# Patient Record
Sex: MALE | Race: BLACK OR AFRICAN AMERICAN | Employment: OTHER | URBAN - METROPOLITAN AREA
[De-identification: names, ages, dates, MRNs, and addresses within clinical notes are randomized per-mention and may not be internally consistent; named-entity substitution may affect disease eponyms.]

---

## 2022-08-29 ENCOUNTER — HOSPITAL ENCOUNTER (OUTPATIENT)
Dept: PHYSICAL THERAPY | Age: 62
Discharge: HOME | End: 2022-08-29
Payer: OTHER GOVERNMENT

## 2022-08-29 PROCEDURE — 97162 PT EVAL MOD COMPLEX 30 MIN: CPT

## 2022-08-29 PROCEDURE — 97110 THERAPEUTIC EXERCISES: CPT

## 2022-08-29 NOTE — PROGRESS NOTES
PT DAILY TREATMENT NOTE 10-18    Patient Name: Kelsie Syed  Date:2022  : 1960  [x]  Patient  Verified  Payor: HUANG / Plan: Jama Miranda 74 / Product Type:  /    In time:1110  Out time:1150  Total Treatment Time (min): 40  Visit #: 1 of 8    Treatment Area: Lower back pain [M54.50]  Other low back pain [M54.59]    SUBJECTIVE  Pain Level (0-10 scale): 9  Any medication changes, allergies to medications, adverse drug reactions, diagnosis change, or new procedure performed?: [x] No    [] Yes (see summary sheet for update)  Subjective functional status/changes:   [] No changes reported  See eval    OBJECTIVE    30 min [x]Eval                  []Re-Eval       10 min Therapeutic Exercise:  [] See flow sheet :   Rationale: increase ROM and increase strength to improve the patients ability to perform daily activities      With   [] TE   [] TA   [] neuro   [] other: Patient Education: [x] Review HEP    [] Progressed/Changed HEP based on:   [] positioning   [] body mechanics   [] transfers   [] heat/ice application    [] other:      Other Objective/Functional Measures:      Pain Level (0-10 scale) post treatment: 9    ASSESSMENT/Changes in Function: see POC    Patient will continue to benefit from skilled PT services to modify and progress therapeutic interventions, address functional mobility deficits, address ROM deficits, address strength deficits, analyze and address soft tissue restrictions, and analyze and cue movement patterns to attain remaining goals. [x]  See Plan of Care  []  See progress note/recertification  []  See Discharge Summary         Progress towards goals / Updated goals:     Short Term Goals: To be accomplished in 2 weeks:  1. I and compliant with HEP for self management of symptoms. IE: issued HEP  Long Term Goals: To be accomplished in 4 weeks:  1. Improve FOTO to 53 to indicate improved function with daily activities. IE:40  2.  Increase B hip strength to grossly 4+/5 to improve stability for yard work. IE:grossly 3+/5  3. Increase L/S flexion to finger tips to ankles to increase ease with ADLs. iE: 6\" away from ankles  4. Patient will report >=50% improvement to increase ease with daily activities.    IE; 0%  PLAN  []  Upgrade activities as tolerated     [x]  Continue plan of care  []  Update interventions per flow sheet       []  Discharge due to:_  []  Other:_      BAILEY Funez, CMTPT 8/29/2022  1:50 PM    Future Appointments   Date Time Provider Oli Mcdaniel   9/2/2022  9:00 AM Trellis Postin, PT [facility]   9/13/2022 10:30 AM Trellis Postin, PT [facility]   9/15/2022  9:15 AM Lyle Caruso, PT [facility]   9/19/2022 10:30 AM Trellis Postin, PT [facility]   9/22/2022  9:45 AM Trellis Postin, PT [facility]   9/27/2022 10:30 AM Lyle Caruso, PT [facility]   9/29/2022  9:15 AM Lyle Caruso, PT [facility]   10/5/2022  8:30 AM Clarice Fuentes MD [address] [facility]

## 2022-08-29 NOTE — PROGRESS NOTES
[facility]  [address]  [place], [address]  [phone] fax    Plan of Care/ Statement of Necessity for Physical Therapy Services    Patient name: Allan Dey Start of Care: 2022   Referral source: Maryanne Vu : 1960    Medical Diagnosis: Lower back pain [M54.50]  Other low back pain [M54.59]  Payor: HUANG / Plan: Jama Miranda 74 / Product Type:  /  Onset Date:4/15/22    Treatment Diagnosis: Back pain   Prior Hospitalization: see medical history Provider#: [id number]   Medications: Verified on Patient summary List    Comorbidities: HTN; depression   Prior Level of Function: No limitations; no prior c/o back pain     The Plan of Care and following information is based on the information from the initial evaluation. Assessment/ key information: 57y.o. year old male presents with CC of right sided T-L spine back pain that began after patient underwent an urthera reconstruction. Patient reports he was hospitalized for over a month; was on dialyses for kidney failure. Patient as finally discharged from hospital in stable condition, but continues to have significant right sided T-L spine. Impairments noted today: hypertonicity noted in right T-L paraspinals, QL, right glute med/min; decreased L/S flexion; decreased B hip flexibility; decreased B hip strength. Patient will benefit from physical therapy to address deficits, and ultimately to return patient to prior level of function. Evaluation Complexity History MEDIUM  Complexity : 1-2 comorbidities / personal factors will impact the outcome/ POC ; Examination MEDIUM Complexity : 3 Standardized tests and measures addressing body structure, function, activity limitation and / or participation in recreation  ;Presentation MEDIUM Complexity : Evolving with changing characteristics  ; Clinical Decision Making MEDIUM Complexity : FOTO score of 26-74  Overall Complexity Rating: MEDIUM  Problem List: pain affecting function, decrease ROM, decrease strength, decrease ADL/ functional abilitiies, decrease activity tolerance, and decrease flexibility/ joint mobility   Treatment Plan may include any combination of the following: Therapeutic exercise, Neuromuscular re-education, Physical agent/modality, Manual therapy, and Patient education  Patient / Family readiness to learn indicated by: asking questions, trying to perform skills, and interest  Persons(s) to be included in education: patient (P)  Barriers to Learning/Limitations: None  Patient Goal (s): to decrease pain  Patient Self Reported Health Status: poor  Rehabilitation Potential: good    Short Term Goals: To be accomplished in 2 weeks:  1. I and compliant with HEP for self management of symptoms. Long Term Goals: To be accomplished in 4 weeks:  1. Improve FOTO to 53 to indicate improved function with daily activities. 2. Increase B hip strength to grossly 4+/5 to improve stability for yard work  3. Increase L/S flexion to finger tips to ankles to increase ease with ADLs. 4. Patient will report >=50% improvement to increase ease with daily activities. Frequency / Duration: Patient to be seen 2 times per week for 4 weeks. Patient/ Caregiver education and instruction: Diagnosis, prognosis, exercises   [x]  Plan of care has been reviewed with NIMESH Collins, PT 8/29/2022 12:09 PM    ________________________________________________________________________    I certify that the above Therapy Services are being furnished while the patient is under my care. I agree with the treatment plan and certify that this therapy is necessary.     Physician's Signature:____________Date:_________TIME:________     Chintan Garcia*  ** Signature, Date and Time must be completed for valid certification **    Please sign and return to [facility]. [address] [place] [address] [address]  [phone] fax

## 2022-09-02 ENCOUNTER — HOSPITAL ENCOUNTER (OUTPATIENT)
Dept: PHYSICAL THERAPY | Age: 62
Discharge: HOME | End: 2022-09-02
Payer: OTHER GOVERNMENT

## 2022-09-02 PROCEDURE — 97110 THERAPEUTIC EXERCISES: CPT

## 2022-09-02 PROCEDURE — 97140 MANUAL THERAPY 1/> REGIONS: CPT

## 2022-09-02 PROCEDURE — 97112 NEUROMUSCULAR REEDUCATION: CPT

## 2022-09-02 NOTE — PROGRESS NOTES
PT DAILY TREATMENT NOTE 10-18    Patient Name: Omari Akins  Date:2022  : 1960  [x]  Patient  Verified  Payor:  / Plan: Jama Miranda 74 / Product Type:  /    In time:900  Out time:953  Total Treatment Time (min): 53  Visit #: 2 of 8        Treatment Area: Other low back pain [M54.59]    SUBJECTIVE  Pain Level (0-10 scale): 9 with meds  Any medication changes, allergies to medications, adverse drug reactions, diagnosis change, or new procedure performed?: [x] No    [] Yes (see summary sheet for update)  Subjective functional status/changes:   [] No changes reported  I haven't done any of my exercises. I fell yesterday on my right side so I've been in a lot of pain. OBJECTIVE    Modality rationale: decrease pain to improve the patients ability to tolerate post exercise soreness   Min Type Additional Details   10 []  Ice     [x]  heat  []  Ice massage  []  Laser   []  Anodyne Position:seated  Location:T-l spin   [] Skin assessment post-treatment:  []intact []redness- no adverse reaction    []redness - adverse reaction:         25 min Therapeutic Exercise:  [x] See flow sheet :   Rationale: increase ROM and increase strength to improve the patients ability to perform daily activities      10 min Neuromuscular Re-education:  [x]  See flow sheet :   Rationale: increase ROM and increase strength  to improve the patients ability to recruit TA to improve stability for daily activities     8 min Manual Therapy:  STM right T-L paraspinals; grade 1 right UPAs T/S   The manual therapy interventions were performed at a separate and distinct time from the therapeutic activities interventions.   Rationale: decrease pain, increase ROM, increase tissue extensibility, and decrease trigger points to relax mm and improve mobility for daily activities   With   [] TE   [] TA   [] neuro   [] other: Patient Education: [x] Review HEP    [] Progressed/Changed HEP based on:   [] positioning   [] body mechanics   [] transfers   [] heat/ice application    [] other:      Other Objective/Functional Measures:      Pain Level (0-10 scale) post treatment: 6-7    ASSESSMENT/Changes in Function: 1st follow up today. Patient very apprehensive in the beginning of the treatment, but gained confidence with exercises. Positive response to therapy, as patient left with significant lower pain levels. Patient will continue to benefit from skilled PT services to modify and progress therapeutic interventions, address functional mobility deficits, address ROM deficits, address strength deficits, analyze and address soft tissue restrictions, and analyze and cue movement patterns to attain remaining goals. []  See Plan of Care  []  See progress note/recertification  []  See Discharge Summary         Progress towards goals / Updated goals:  Short Term Goals: To be accomplished in 2 weeks:  1. I and compliant with HEP for self management of symptoms. IE: issued HEP  Long Term Goals: To be accomplished in 4 weeks:  1. Improve FOTO to 53 to indicate improved function with daily activities. IE:40  2. Increase B hip strength to grossly 4+/5 to improve stability for yard work. IE:grossly 3+/5  3. Increase L/S flexion to finger tips to ankles to increase ease with ADLs. iE: 6\" away from ankles  4. Patient will report >=50% improvement to increase ease with daily activities.    IE; 0%    PLAN  []  Upgrade activities as tolerated     [x]  Continue plan of care  []  Update interventions per flow sheet       []  Discharge due to:_  []  Other:_      BAILEY Mcmanus, CMTPT 9/2/2022  8:36 AM    Future Appointments   Date Time Provider Oli Mcdaniel   9/2/2022  9:00 AM Tian Oviedo, PT [facility]   9/13/2022 10:30 AM Tian Oviedo, PT [facility]   9/15/2022  9:15 AM Richardson Davies, PT [facility]   9/19/2022 10:30 AM Tian Oviedo PT [facility]   9/22/2022  9:45 AM Natanael SINCLAIR, PT [facility]   9/27/2022 10:30 AM Linn Pelaez, PT [facility]   9/29/2022  9:15 AM Linn Pelaez, PT [facility]   10/5/2022  8:30 AM Ying Altman MD [address] [facility]

## 2022-09-13 ENCOUNTER — HOSPITAL ENCOUNTER (OUTPATIENT)
Dept: PHYSICAL THERAPY | Age: 62
Discharge: HOME | End: 2022-09-13
Payer: OTHER GOVERNMENT

## 2022-09-13 PROCEDURE — 97110 THERAPEUTIC EXERCISES: CPT

## 2022-09-13 PROCEDURE — 97112 NEUROMUSCULAR REEDUCATION: CPT

## 2022-09-13 PROCEDURE — 97140 MANUAL THERAPY 1/> REGIONS: CPT

## 2022-09-13 NOTE — PROGRESS NOTES
PT DAILY TREATMENT NOTE 10-18    Patient Name: Karl Alicea  Date:2022  : 1960  [x]  Patient  Verified  Payor: HUANG / Plan: Jama Miranda 74 / Product Type:  /    In time:1030  Out time:1125  Total Treatment Time (min): 55  Visit #: 3 of 8        Treatment Area: Other low back pain [M54.59]    SUBJECTIVE  Pain Level (0-10 scale): 7-8 with meds  Any medication changes, allergies to medications, adverse drug reactions, diagnosis change, or new procedure performed?: [x] No    [] Yes (see summary sheet for update)  Subjective functional status/changes:   [] No changes reported  I felt pretty good after last time, but the pain came back. OBJECTIVE    Modality rationale: decrease pain to improve the patients ability to tolerate post exercise soreness   Min Type Additional Details   10 []  Ice     [x]  heat  []  Ice massage  []  Laser   []  Anodyne Position:seated  Location:T-L spine   [] Skin assessment post-treatment:  []intact []redness- no adverse reaction    []redness - adverse reaction:         27 min Therapeutic Exercise:  [x] See flow sheet :   Rationale: increase ROM and increase strength to improve the patients ability to perform daily activities      10 min Neuromuscular Re-education:  [x]  See flow sheet :   Rationale: increase strength  to improve the patients ability to recruit TA and glutes for daily activities     8 min Manual Therapy:  STM right T-L paraspinals; grade 1 right UPAs T/S   The manual therapy interventions were performed at a separate and distinct time from the therapeutic activities interventions.   Rationale: decrease pain, increase ROM, and increase tissue extensibility to perform daily activities   With   [] TE   [] TA   [] neuro   [] other: Patient Education: [x] Review HEP    [] Progressed/Changed HEP based on:   [] positioning   [] body mechanics   [] transfers   [] heat/ice application    [] other:      Other Objective/Functional Measures: Pain Level (0-10 scale) post treatment: 5-6    ASSESSMENT/Changes in Function: Added 1/2 prone hip work today; tolerated well. Positive repsonse to therapy as patient left with significant decrease in pain. Patient will continue to benefit from skilled PT services to  to attain remaining goals. []  See Plan of Care  []  See progress note/recertification  []  See Discharge Summary         Progress towards goals / Updated goals:  Short Term Goals: To be accomplished in 2 weeks:  1. I and compliant with HEP for self management of symptoms. IE: issued HEP  Current: goal met 9/13/22  Long Term Goals: To be accomplished in 4 weeks:  1. Improve FOTO to 53 to indicate improved function with daily activities. IE:40  2. Increase B hip strength to grossly 4+/5 to improve stability for yard work. IE:grossly 3+/5  3. Increase L/S flexion to finger tips to ankles to increase ease with ADLs. iE: 6\" away from ankles  4. Patient will report >=50% improvement to increase ease with daily activities.    IE; 0%    PLAN  [x]  Upgrade activities as tolerated     []  Continue plan of care  []  Update interventions per flow sheet       []  Discharge due to:_  []  Other:_      BAILEY To, CMTPT 9/13/2022  8:45 AM    Future Appointments   Date Time Provider Oli Mcdaniel   9/13/2022 10:30 AM Diaz Snell, PT [facility]   9/15/2022  9:15 AM Euna Shed, PT [facility]   9/19/2022 10:30 AM Diaz Snell, PT [facility]   9/22/2022  9:45 AM Diaz Snell, PT [facility]   9/27/2022 10:30 AM Euna Shed, PT [facility]   9/29/2022  9:15 AM Euna Shed, PT [facility]   10/5/2022  8:30 AM José Luis Nicole MD [address] [facility]

## 2022-09-15 ENCOUNTER — HOSPITAL ENCOUNTER (OUTPATIENT)
Dept: PHYSICAL THERAPY | Age: 62
Discharge: HOME | End: 2022-09-15
Payer: OTHER GOVERNMENT

## 2022-09-15 PROCEDURE — 97140 MANUAL THERAPY 1/> REGIONS: CPT

## 2022-09-15 PROCEDURE — 97110 THERAPEUTIC EXERCISES: CPT

## 2022-09-15 PROCEDURE — 97112 NEUROMUSCULAR REEDUCATION: CPT

## 2022-09-15 NOTE — PROGRESS NOTES
PT DAILY TREATMENT NOTE     Patient Name: Adri Serrano  Date:9/15/2022  : 1960  [x]  Patient  Verified  Payor: HUANG / Plan: Jama Miranda 74 / Product Type:  /    In time:915am  Out time:1010am  Total Treatment Time (min): 55  Visit #: 4 of 8     Treatment Area: Other low back pain [M54.59]    SUBJECTIVE  Pain Level (0-10 scale): 7  Any medication changes, allergies to medications, adverse drug reactions, diagnosis change, or new procedure performed?: [x] No    [] Yes (see summary sheet for update)  Subjective functional status/changes:   [] No changes reported  Reports no new c/o today    OBJECTIVE    Modality rationale: decrease pain and increase tissue extensibility to improve the patients ability to manage ADLs with improved ease. Min Type Additional Details   10 []  Ice     [x]  heat  []  Ice massage  []  Laser   []  Anodyne Position:seated  Location:right mid and low back   [] Skin assessment post-treatment:  []intact []redness- no adverse reaction    []redness - adverse reaction:      25 min Therapeutic Exercise:  [x] See flow sheet :   Rationale: increase ROM and increase strength to improve the patients ability to perform daily activities      12 min Neuromuscular Re-education:  [x]  See flow sheet :   Rationale: increase strength  to improve the patients ability to recruit TA and glutes for daily activities      8 min Manual Therapy:  STM right T-L paraspinals; grade 1 right UPAs T/S   The manual therapy interventions were performed at a separate and distinct time from the therapeutic activities interventions.   Rationale: decrease pain, increase ROM, and increase tissue extensibility to perform daily activities         With   [] TE   [] TA   [] neuro   [] other: Patient Education: [x] Review HEP    [] Progressed/Changed HEP based on:   [] positioning   [] body mechanics   [] transfers   [] heat/ice application    [] other:      Other Objective/Functional Measures: added yellow back to sidelying hip exercises; dead bug and alt UE/LE in sitting as well     Pain Level (0-10 scale) post treatment: 1    ASSESSMENT/Changes in Function: Pt performs new exercises as directed, requiring tactile and demonstration cues to activate TrA. Good response to exercise and manual therapy today, reporting very minimal pain upon leaving. Patient will continue to benefit from skilled PT services to modify and progress therapeutic interventions, address functional mobility deficits, address ROM deficits, address strength deficits, analyze and address soft tissue restrictions, analyze and cue movement patterns, analyze and modify body mechanics/ergonomics, assess and modify postural abnormalities, address imbalance/dizziness, and instruct in home and community integration to attain remaining goals. []  See Plan of Care  []  See progress note/recertification  []  See Discharge Summary          Progress towards goals / Updated goals:  Short Term Goals: To be accomplished in 2 weeks:  1. I and compliant with HEP for self management of symptoms. IE: issued HEP  Current: goal met 9/13/22  Long Term Goals: To be accomplished in 4 weeks:  1. Improve FOTO to 53 to indicate improved function with daily activities. IE:40  2. Increase B hip strength to grossly 4+/5 to improve stability for yard work. IE:grossly 3+/5  3. Increase L/S flexion to finger tips to ankles to increase ease with ADLs. iE: 6\" away from ankles  4. Patient will report >=50% improvement to increase ease with daily activities.    IE; 0%    PLAN  []  Upgrade activities as tolerated     [x]  Continue plan of care  []  Update interventions per flow sheet       []  Discharge due to:_  []  Other:_      Carrie Frazier PT 9/15/2022  9:28 AM    Future Appointments   Date Time Provider Oli Mcdaniel   9/19/2022 10:30 AM Christal Persaud PT [facility]   9/22/2022  9:45 [facility]   9/27/2022 10:30 AM Trina Pearl [facility]   9/29/2022  9:15 AM Richardson Davies PT [facility]   10/5/2022  8:30 AM Sil Egan MD [address]

## 2022-09-19 ENCOUNTER — HOSPITAL ENCOUNTER (OUTPATIENT)
Dept: PHYSICAL THERAPY | Age: 62
Discharge: HOME | End: 2022-09-19
Payer: OTHER GOVERNMENT

## 2022-09-19 PROCEDURE — 97140 MANUAL THERAPY 1/> REGIONS: CPT

## 2022-09-19 PROCEDURE — 97112 NEUROMUSCULAR REEDUCATION: CPT

## 2022-09-19 PROCEDURE — 97110 THERAPEUTIC EXERCISES: CPT

## 2022-09-19 NOTE — PROGRESS NOTES
PT DAILY TREATMENT NOTE 10-18    Patient Name: Ramirez Luis  Date:2022  : 1960  [x]  Patient  Verified  Payor: HUANG / Plan: Jama Miranda 74 / Product Type:  /    In time:1029  Out time:1127  Total Treatment Time (min): 58  Visit #: 5 of 8    Treatment Area: Other low back pain [M54.59]    SUBJECTIVE  Pain Level (0-10 scale): 7 with meds  Any medication changes, allergies to medications, adverse drug reactions, diagnosis change, or new procedure performed?: [x] No    [] Yes (see summary sheet for update)  Subjective functional status/changes:   [x] No changes reported       OBJECTIVE    Modality rationale: decrease pain to improve the patients ability to tolerate post exercise soreness   Min Type Additional Details   10 []  Ice     [x]  heat  []  Ice massage  []  Laser   []  Anodyne Position:seated  Location:right mid and low back   [] Skin assessment post-treatment:  []intact []redness- no adverse reaction    []redness - adverse reaction:         23 min Therapeutic Exercise:  [x] See flow sheet :   Rationale: increase ROM and increase strength to improve the patients ability to perform daily activities      17 min Neuromuscular Re-education:  [x]  See flow sheet :   Rationale: increase ROM and increase strength  to improve the patients ability to recruit TA and glutes for daily activities     8 min Manual Therapy:   STM right T-L paraspinals; grade 1 right UPAs T/S   The manual therapy interventions were performed at a separate and distinct time from the therapeutic activities interventions.   Rationale: decrease pain, increase ROM, and increase tissue extensibility to improve mobility for daily activities   With   [] TE   [] TA   [] neuro   [] other: Patient Education: [x] Review HEP    [] Progressed/Changed HEP based on:   [] positioning   [] body mechanics   [] transfers   [] heat/ice application    [] other:      Other Objective/Functional Measures:      Pain Level (0-10 scale) post treatment: 3    ASSESSMENT/Changes in Function: Added hip x 3 @ trap bar to improve hip strength and TA strength; added standing ER B reach backs to improve T/S mobility. Patient will continue to benefit from skilled PT services to modify and progress therapeutic interventions, address functional mobility deficits, address ROM deficits, address strength deficits, analyze and address soft tissue restrictions, analyze and cue movement patterns, and assess and modify postural abnormalities to attain remaining goals. []  See Plan of Care  []  See progress note/recertification  []  See Discharge Summary         Progress towards goals / Updated goals:  Short Term Goals: To be accomplished in 2 weeks:  1. I and compliant with HEP for self management of symptoms. IE: issued HEP  Current: goal met 9/13/22  Long Term Goals: To be accomplished in 4 weeks:  1. Improve FOTO to 53 to indicate improved function with daily activities. IE:40  2. Increase B hip strength to grossly 4+/5 to improve stability for yard work. IE:grossly 3+/5  3. Increase L/S flexion to finger tips to ankles to increase ease with ADLs. iE: 6\" away from ankles  4. Patient will report >=50% improvement to increase ease with daily activities.    IE; 0%    PLAN  [x]  Upgrade activities as tolerated     []  Continue plan of care  []  Update interventions per flow sheet       []  Discharge due to:_  []  Other:_      Kristen Pantoja, MPT, CMTPT 9/19/2022  8:40 AM    Future Appointments   Date Time Provider Oli Mcdaniel   9/19/2022 10:30 [facility]   9/22/2022  9:45 AM [facility]   9/27/2022 10:30 AM [facility]   9/29/2022  9:15 AM [facility]   10/5/2022  8:30 AM MD Kwan Goodman

## 2022-09-22 ENCOUNTER — HOSPITAL ENCOUNTER (OUTPATIENT)
Dept: PHYSICAL THERAPY | Age: 62
Discharge: HOME | End: 2022-09-22
Payer: OTHER GOVERNMENT

## 2022-09-22 PROCEDURE — 97110 THERAPEUTIC EXERCISES: CPT

## 2022-09-22 PROCEDURE — 97140 MANUAL THERAPY 1/> REGIONS: CPT

## 2022-09-22 PROCEDURE — 97112 NEUROMUSCULAR REEDUCATION: CPT

## 2022-09-22 NOTE — PROGRESS NOTES
PT DAILY TREATMENT NOTE 10-18    Patient Name: Laya Ramos  Date:2022  : 1960  [x]  Patient  Verified  Payor: HUANG / Plan: Jama Miranda 74 / Product Type:  /    In time:945  Out time:1038  Total Treatment Time (min): 53  Visit #: 6 of 8        Treatment Area: Other low back pain [M54.59]    SUBJECTIVE  Pain Level (0-10 scale): 7-8  Any medication changes, allergies to medications, adverse drug reactions, diagnosis change, or new procedure performed?: [x] No    [] Yes (see summary sheet for update)  Subjective functional status/changes:   [] No changes reported  The pain keeps coming back. It's something that needs to be taken care in the hospital. I have nerve damage, so it keeps coming back. The numbness is still 100% there. OBJECTIVE    Modality rationale: decrease pain to improve the patients ability to tolerate post exercise soreness   Min Type Additional Details   10 []  Ice     [x]  heat  []  Ice massage  []  Laser   []  Anodyne Position:seated  Location:right mid and low back   [] Skin assessment post-treatment:  []intact []redness- no adverse reaction    []redness - adverse reaction:         23 min Therapeutic Exercise:  [x] See flow sheet :   Rationale: increase ROM and increase strength to improve the patients ability to perform daily activities      12 min Neuromuscular Re-education:  [x]  See flow sheet :   Rationale: increase ROM and increase strength  to improve the patients ability to recruit TA and glutes for daily activities     8 min Manual Therapy:   STM right T-L paraspinals; grade 1 right UPAs T/S   The manual therapy interventions were performed at a separate and distinct time from the therapeutic activities interventions.   Rationale: decrease pain, increase ROM, and increase tissue extensibility to relax mm for daily activities   With   [] TE   [] TA   [] neuro   [] other: Patient Education: [x] Review HEP    [] Progressed/Changed HEP based on: [] positioning   [] body mechanics   [] transfers   [] heat/ice application    [] other:      Other Objective/Functional Measures:      Pain Level (0-10 scale) post treatment: 2    ASSESSMENT/Changes in Function: No carry over between sessions. Patient arrives in 7-9/10 pain and leaves with 1-3/10 pain. Reports feeling \"good\" after each session, but reports pain returns within a few hours because of the\"nerve damage\". Both patient and therapist agree to discharge in two visit with an extensive HEP. Patient will continue to benefit from skilled PT services to modify and progress therapeutic interventions, address functional mobility deficits, address ROM deficits, address strength deficits, and analyze and cue movement patterns to attain remaining goals. []  See Plan of Care  []  See progress note/recertification  []  See Discharge Summary         Progress towards goals / Updated goals:  Short Term Goals: To be accomplished in 2 weeks:  1. I and compliant with HEP for self management of symptoms. IE: issued HEP  Current: goal met 9/13/22  Long Term Goals: To be accomplished in 4 weeks:  1. Improve FOTO to 53 to indicate improved function with daily activities. IE:40  2. Increase B hip strength to grossly 4+/5 to improve stability for yard work. IE:grossly 3+/5  3. Increase L/S flexion to finger tips to ankles to increase ease with ADLs. iE: 6\" away from ankles  Current:6\" no change 9/22/22  4. Patient will report >=50% improvement to increase ease with daily activities.    IE; 0%  Current: 30% 9/22/22    PLAN  [x]  Upgrade activities as tolerated     []  Continue plan of care  []  Update interventions per flow sheet       []  Discharge due to:_  []  Other:_      Chinmay Roberts, BAILEY, CMTPT 9/22/2022  8:48 AM    Future Appointments   Date Time Provider Oli Mcdaniel   9/22/2022  9:45 AM Vilma Nicholson, PT [facility]   9/27/2022 10:30 AM Horacio Jim, PT [facility]   9/29/2022  9:15 AM Liam Elias, S[facility]   10/5/2022  8:30 AM Ying Altman MD [id number] [facility]

## 2022-09-27 ENCOUNTER — HOSPITAL ENCOUNTER (OUTPATIENT)
Dept: PHYSICAL THERAPY | Age: 62
Discharge: HOME | End: 2022-09-27
Payer: OTHER GOVERNMENT

## 2022-09-27 PROCEDURE — 97112 NEUROMUSCULAR REEDUCATION: CPT

## 2022-09-27 PROCEDURE — 97110 THERAPEUTIC EXERCISES: CPT

## 2022-09-27 PROCEDURE — 97140 MANUAL THERAPY 1/> REGIONS: CPT

## 2022-09-27 NOTE — PROGRESS NOTES
PT DISCHARGE DAILY NOTE AND [id number]    Patient name: Nichelle Ram Start of Care: 2022   Referral source: Sari Estrada* : 1960   Medical/Treatment Diagnosis: Other low back pain [M54.59] Onset Date:4/15/2022     Prior Hospitalization: see medical history Provider#: [id number]   Medications: Verified on Patient Summary List    Comorbidities: HTN; depression   Prior Level of Function: No limitations; no prior c/o back pain    Visits from Start of Care: 7    Missed Visits: 0    Reporting Period : 2022 to 2022    Date:2022  : 1960  [x]  Patient  Verified  Payor:  / Plan: Jama Miranda 74 / Product Type:  /    In time:1029am  Out time:1126am  Total Treatment Time (min): 62  Visit #: 7 of 8    SUBJECTIVE  Pain Level (0-10 scale): 8  Any medication changes, allergies to medications, adverse drug reactions, diagnosis change, or new procedure performed?: [x] No    [] Yes (see summary sheet for update)  Subjective functional status/changes:   [] No changes reported  Reports trying canabis to help with pain without help. Reports trying 600mg of gabapentin for one dose and that temporarily removed pain. Kidney doctor said to be careful with medications due to kidneys having trouble processing medications as they are healing/impaired. OBJECTIVE    Modality rationale: decrease pain and increase tissue extensibility to improve the patients ability to manage self care.    Min Type Additional Details   10 []  Ice     [x]  heat  []  Ice massage  []  Laser   []  Anodyne Position: seated  Location: T/L spine   [] Skin assessment post-treatment:  []intact []redness- no adverse reaction    []redness - adverse reaction:      23 min Therapeutic Exercise:  [x] See flow sheet :   Rationale: increase ROM and increase strength to improve the patients ability to perform daily activities      12 min Neuromuscular Re-education:  [x]  See flow sheet :   Rationale: increase ROM and increase strength  to improve the patients ability to recruit TA and glutes for daily activities      8 min Manual Therapy:   STM right T-L paraspinals; grade 1 right UPAs T/S   The manual therapy interventions were performed at a separate and distinct time from the therapeutic activities interventions. Rationale: decrease pain, increase ROM, and increase tissue extensibility to relax mm for daily activities           With   [] TE   [] TA   [] neuro   [] other: Patient Education: [x] Review HEP    [] Progressed/Changed HEP based on:   [] positioning   [] body mechanics   [] transfers   [] heat/ice application    [] other:      Other Objective/Functional Measures: see summary of care     Pain Level (0-10 scale) post treatment: 2    Summary of Care:  Short Term Goals: To be accomplished in 2 weeks:  1. I and compliant with HEP for self management of symptoms. IE: issued HEP  Current: goal met 9/13/22  Long Term Goals: To be accomplished in 4 weeks:  1. Improve FOTO to 53 to indicate improved function with daily activities. IE:40   Current: not met, 36 (9/27/2022)  2. Increase B hip strength to grossly 4+/5 to improve stability for yard work. IE:grossly 3+/5  Current: remains, 3+/5 (9/27/2022)   3. Increase L/S flexion to finger tips to ankles to increase ease with ADLs. iE: 6\" away from ankles  Current:6\" no change 9/22/22  4. Patient will report >=50% improvement to increase ease with daily activities. IE; 0%  Current: 30% 9/22/22    ASSESSMENT/Changes in Function: Pt expresses that when he attends therapy, he feels great pain relief for a couple hours but then the pain returns back to baseline. He has had no change in mobility, flexibility, or strength. His HEP has been updated and it is recommended that he follow-up with his MD for continued guidance. A neurologist or pain science referral may be of benefit.      Thank you for this referral!      PLAN  [x]Discontinue therapy: []Patient has reached or is progressing toward set goals      []Patient is non-compliant or has abdicated      [x]Due to lack of appreciable progress towards set goals    Shlomo Tirado, PT 9/27/2022  10:39 AM

## 2022-09-27 NOTE — PROGRESS NOTES
Physical Therapy Discharge Instructions      [facility]  [address] [facility] [place] [address]  [place], [address]  [phone] fax    Patient: Elsie Cueva  : 1960      Continue Home Exercise Program 2 times per day for 4-8 weeks, then decrease to 3 times per week      Continue with    [] Ice  as needed 3 times per day     [x] Heat           Follow up with MD:     [x] Upon completion of therapy     [] As needed      Recommendations:     []   Return to activity with home program    []   Return to activity with the following modifications:       []Post Rehab Program    []Join Independent aquatic program     []Return to/join local gym    Additional Comments: Continuing with a regular strengthening program will help to support your back and your hips as your body heals. Nerves do have some capacity for repair and exercise can help over time. Follow-up with your doctor and best of luck to you. Great work.      Hali Armenta, PT 2022 10:52 AM

## 2022-09-29 ENCOUNTER — APPOINTMENT (OUTPATIENT)
Dept: PHYSICAL THERAPY | Age: 62
End: 2022-09-29
Payer: OTHER GOVERNMENT

## 2022-10-20 PROBLEM — I10 HYPERTENSION: Status: ACTIVE | Noted: 2022-10-20

## 2023-02-01 RX ORDER — ASCORBIC ACID 500 MG
TABLET ORAL
COMMUNITY
Start: 2022-05-17

## 2023-02-01 RX ORDER — ZOLPIDEM TARTRATE 10 MG/1
TABLET ORAL
COMMUNITY
Start: 2021-08-13

## 2023-02-01 RX ORDER — FERROUS SULFATE 325(65) MG
TABLET ORAL
COMMUNITY
Start: 2022-05-17

## 2023-02-01 RX ORDER — HYDROCHLOROTHIAZIDE 25 MG/1
TABLET ORAL
COMMUNITY
Start: 2021-08-06

## 2023-02-01 RX ORDER — ROSUVASTATIN CALCIUM 10 MG/1
TABLET, COATED ORAL
COMMUNITY
Start: 2022-02-18

## 2023-02-01 RX ORDER — FINASTERIDE 5 MG/1
TABLET, FILM COATED ORAL
COMMUNITY
Start: 2022-05-10

## 2023-02-01 RX ORDER — ACETAMINOPHEN 500 MG
TABLET ORAL
COMMUNITY
Start: 2021-08-11

## 2023-02-01 RX ORDER — WARFARIN SODIUM 5 MG/1
5 TABLET ORAL DAILY
COMMUNITY
Start: 2022-05-07

## 2023-02-01 RX ORDER — AMOXICILLIN 250 MG/1
CAPSULE ORAL
COMMUNITY
Start: 2022-08-22

## 2023-02-01 RX ORDER — OXYCODONE HYDROCHLORIDE 5 MG/1
TABLET ORAL
COMMUNITY
Start: 2022-04-15

## 2024-09-07 ENCOUNTER — HOSPITAL ENCOUNTER (EMERGENCY)
Facility: HOSPITAL | Age: 64
Discharge: HOME | End: 2024-09-07
Payer: MEDICARE

## 2024-09-07 VITALS
SYSTOLIC BLOOD PRESSURE: 153 MMHG | RESPIRATION RATE: 18 BRPM | BODY MASS INDEX: 37.67 KG/M2 | TEMPERATURE: 98.7 F | OXYGEN SATURATION: 98 % | WEIGHT: 240 LBS | HEIGHT: 67 IN | HEART RATE: 74 BPM | DIASTOLIC BLOOD PRESSURE: 86 MMHG

## 2024-09-07 DIAGNOSIS — J06.9 UPPER RESPIRATORY TRACT INFECTION, UNSPECIFIED TYPE: ICD-10-CM

## 2024-09-07 DIAGNOSIS — Z20.822 EXPOSURE TO CONFIRMED CASE OF COVID-19: Primary | ICD-10-CM

## 2024-09-07 LAB
FLUAV RNA SPEC QL NAA+PROBE: NOT DETECTED
FLUBV RNA SPEC QL NAA+PROBE: NOT DETECTED
SARS-COV-2 RNA RESP QL NAA+PROBE: DETECTED
SOURCE: ABNORMAL

## 2024-09-07 PROCEDURE — 99283 EMERGENCY DEPT VISIT LOW MDM: CPT

## 2024-09-07 PROCEDURE — 87636 SARSCOV2 & INF A&B AMP PRB: CPT

## 2024-09-07 RX ORDER — DEXTROMETHORPHN/ACETAMINOPH/CP 10-325-2MG
1 TABLET ORAL 2 TIMES DAILY
Qty: 20 TABLET | Refills: 0 | Status: SHIPPED | OUTPATIENT
Start: 2024-09-07

## 2024-09-07 RX ORDER — GUAIFENESIN 600 MG/1
600 TABLET, EXTENDED RELEASE ORAL 2 TIMES DAILY
Qty: 20 TABLET | Refills: 0 | Status: SHIPPED | OUTPATIENT
Start: 2024-09-07 | End: 2024-09-17

## 2024-09-07 RX ORDER — FLUTICASONE PROPIONATE 50 MCG
1 SPRAY, SUSPENSION (ML) NASAL DAILY
Qty: 32 G | Refills: 1 | Status: SHIPPED | OUTPATIENT
Start: 2024-09-07

## 2024-09-07 ASSESSMENT — ENCOUNTER SYMPTOMS
RHINORRHEA: 1
COUGH: 1
GASTROINTESTINAL NEGATIVE: 1
ALLERGIC/IMMUNOLOGIC NEGATIVE: 1
EYES NEGATIVE: 1

## 2024-09-07 ASSESSMENT — PAIN SCALES - GENERAL: PAINLEVEL_OUTOF10: 8

## 2024-09-07 ASSESSMENT — PAIN - FUNCTIONAL ASSESSMENT: PAIN_FUNCTIONAL_ASSESSMENT: 0-10

## 2024-09-07 ASSESSMENT — PAIN DESCRIPTION - LOCATION: LOCATION: HEAD

## 2024-09-07 NOTE — ED TRIAGE NOTES
Patient presents with headache, loss of taste and runny nose x3 days. Patient came back from cruise to [place] on 08/31/24.

## 2024-09-07 NOTE — ED NOTES
Pt discharged to home. Ambulated with steady gait. Pt verbalized understanding of discharge instructions prior to departure.

## 2024-09-07 NOTE — ED PROVIDER NOTES
[facility] EMERGENCY DEPT  EMERGENCY DEPARTMENT ENCOUNTER      Pt Name: Peyman Samaniego  MRN: [id number]  Birthdate 1960  Date of evaluation: 9/7/2024  Provider: JESICA Killian  8:48 AM    CHIEF COMPLAINT       Chief Complaint   Patient presents with    Headache    Nasal Congestion    Taste Change         HISTORY OF PRESENT ILLNESS    Peyman Samaniego is a 64 y.o. male who presents to the emergency department likely COVID.  2 of his friends that he traveled with to [facility] on the crew ship for 9 days turned out to be COVID-positive and patient is experiencing nasal congestion slight headache loss of taste and smell.  Endorses mild cough.  Denies fever.    HPI    Nursing Notes were reviewed.    REVIEW OF SYSTEMS       Review of Systems   Constitutional:  Negative for fever.   HENT:  Positive for congestion and rhinorrhea.    Eyes: Negative.    Respiratory:  Positive for cough.    Cardiovascular: Negative.    Gastrointestinal: Negative.    Endocrine: Negative.    Genitourinary: Negative.    Musculoskeletal: Negative.    Skin: Negative.    Allergic/Immunologic: Negative.    Neurological:  Positive for headaches.   Hematological: Negative.    Psychiatric/Behavioral: Negative.         Except as noted above the remainder of the review of systems was reviewed and negative.       PAST MEDICAL HISTORY     Past Medical History:   Diagnosis Date    Hypercholesterolemia     Hypertension          SURGICAL HISTORY       Past Surgical History:   Procedure Laterality Date    UROLOGICAL SURGERY  04/15/2022    URETHROPLASTY, STAGE 1, FOR FISTULA, DIVERTICULUM, OR STRICTURE         CURRENT MEDICATIONS       Previous Medications    ACETAMINOPHEN (TYLENOL) 500 MG TABLET    ceived the following from [facility]: Outside name: acetaminophen (TYLENOL) 500 mg tablet    AMLODIPINE (NORVASC) 2.5 MG TABLET        AMOXICILLIN (AMOXIL) 250 MG CAPSULE    TAKE 1 CAPSULE BY MOUTH EVERY 6 HOURS TO COMPLETION